# Patient Record
Sex: FEMALE | Race: ASIAN | Employment: PART TIME | URBAN - METROPOLITAN AREA
[De-identification: names, ages, dates, MRNs, and addresses within clinical notes are randomized per-mention and may not be internally consistent; named-entity substitution may affect disease eponyms.]

---

## 2017-09-16 ENCOUNTER — HOSPITAL ENCOUNTER (EMERGENCY)
Age: 22
Discharge: HOME | End: 2017-09-16
Attending: EMERGENCY MEDICINE
Payer: COMMERCIAL

## 2017-09-16 VITALS
DIASTOLIC BLOOD PRESSURE: 81 MMHG | RESPIRATION RATE: 18 BRPM | WEIGHT: 91.2 LBS | OXYGEN SATURATION: 99 % | HEART RATE: 76 BPM | BODY MASS INDEX: 18.39 KG/M2 | SYSTOLIC BLOOD PRESSURE: 125 MMHG | TEMPERATURE: 97.7 F | HEIGHT: 59 IN

## 2017-09-16 DIAGNOSIS — T19.2XXA FOREIGN BODY IN VAGINA, INITIAL ENCOUNTER: Primary | ICD-10-CM

## 2017-09-16 PROCEDURE — 99282 EMERGENCY DEPT VISIT SF MDM: CPT

## 2017-09-16 RX ORDER — LORATADINE 10 MG/1
10 TABLET ORAL
COMMUNITY

## 2017-09-16 NOTE — ED TRIAGE NOTES
Patient has a small sex toy vibrator in vaginal canal and is unable to get out. Patient went to [facility] and they were not able to get vibrator out either.  Patient c/o abd pain and nausea

## 2017-09-16 NOTE — ROUTINE PROCESS
I have reviewed discharge instructions with the patient. The patient verbalized understanding. VSS, ambulated out of the department in no acute distress.

## 2017-09-16 NOTE — DISCHARGE INSTRUCTIONS
Object in the Vagina: Care Instructions  Your Care Instructions    If something is left in the vagina for too long, it can cause pain and irritation. This could be a tampon, a diaphragm, a pessary, or a vibrator. Sometimes, a condom comes off during sex and gets lost in the vagina. You may have bleeding, a bad smell, and a discharge from the vagina. After the object is taken out, symptoms usually go away. Follow-up care is a key part of your treatment and safety. Be sure to make and go to all appointments, and call your doctor if you are having problems. It's also a good idea to know your test results and keep a list of the medicines you take. How can you care for yourself at home? · If your doctor prescribed antibiotics, take them as directed. Do not stop taking them just because you feel better. You need to take the full course of antibiotics. · Do not use a douche or vaginal wash unless your doctor tells you to. · You can prevent future problems if you limit how long something is in your vagina. For example, change a tampon at least every 4 to 8 hours. When should you call for help? Watch closely for changes in your health, and be sure to contact your doctor if:  · Your symptoms do not improve, or they get worse. · You have a fever. Where can you learn more? Go to http://jeimy-melly.info/. Enter K574 in the search box to learn more about \"Object in the Vagina: Care Instructions. \"  Current as of: March 20, 2017  Content Version: 11.3  © 9445-0769 [employer]. Care instructions adapted under license by [employer] (which disclaims liability or warranty for this information). If you have questions about a medical condition or this instruction, always ask your healthcare professional. [address] any warranty or liability for your use of this information.

## 2017-09-16 NOTE — PROGRESS NOTES
Prior to Admission Medications   Prescriptions Last Dose Informant Patient Reported? Taking?   loratadine (CLARITIN) 10 mg tablet Not Taking at Unknown time  Yes No   Sig: Take 10 mg by mouth daily as needed for Allergies. Facility-Administered Medications: None   Self: List updated per patient interview. Claritin added.

## 2017-09-16 NOTE — ED PROVIDER NOTES
Patient is a 25 y.o. female presenting with foreign body in vagina. Foreign Body in Vagina   Associated symptoms include vaginal pain. Pertinent negatives include no sore throat, no trouble swallowing, no vomiting and no abdominal pain. Pt states that she has a small sex toy vibrator in her vagina and is unable to remove it. She was evaluated at [facility] and they were also unable to remove it. She states that the foreign body has been in her vagina for several hours. She has not had any medications today prior to arrival. Pt states that she feels well today prior to this incident; Denies fever, cold symptoms,headache,  neck pain, visual changes, focal weakness or rash. History reviewed. No pertinent past medical history. History reviewed. No pertinent surgical history. History reviewed. No pertinent family history. Social History     Social History    Marital status: N/A     Spouse name: N/A    Number of children: N/A    Years of education: N/A     Occupational History    Not on file. Social History Main Topics    Smoking status: Never Smoker    Smokeless tobacco: Current User    Alcohol use Yes    Drug use: Not on file    Sexual activity: Not on file     Other Topics Concern    Not on file     Social History Narrative    No narrative on file         ALLERGIES: Review of patient's allergies indicates no known allergies. Review of Systems   Constitutional: Negative for activity change and appetite change. HENT: Negative for facial swelling, sore throat and trouble swallowing. Eyes: Negative. Respiratory: Negative for shortness of breath. Cardiovascular: Negative. Gastrointestinal: Negative for abdominal pain, diarrhea and vomiting. Genitourinary: Positive for vaginal pain. Negative for dysuria. Foreign body in the vagina   Musculoskeletal: Negative for back pain and neck pain. Skin: Negative for color change. Neurological: Negative for headaches. Psychiatric/Behavioral: Negative. Vitals:    09/16/17 1314   BP: 125/81   Pulse: 76   Resp: 18   Temp: 97.7 °F (36.5 °C)   SpO2: 99%   Weight: 41.4 kg (91 lb 3.2 oz)   Height: 4' 11\" (1.499 m)            Physical Exam   Constitutional: She is oriented to person, place, and time. She appears well-nourished. Female; college student; non smoker   HENT:   Head: Normocephalic. Mouth/Throat: Oropharynx is clear and moist.   Eyes: Pupils are equal, round, and reactive to light. Neck: Normal range of motion. Neck supple. Cardiovascular: Normal rate and regular rhythm. Pulmonary/Chest: Effort normal and breath sounds normal.   Abdominal: Soft. Bowel sounds are normal.   Genitourinary:   Genitourinary Comments: Blue sex toy vibrator visible in the vaginal vault successfully removed using pelvic forceps. Post removal exam:Pelvic exam: Normal labia, vulva without rash or lesions; no vaginal discharge or bleeding; no cervical motion tenderness; cervix is closed. Musculoskeletal: Normal range of motion. Neurological: She is alert and oriented to person, place, and time. Skin: Skin is warm and dry. No rash noted. Nursing note and vitals reviewed. [facility]  ED Course       Foreign Body Removal  Date/Time: 9/16/2017 1:50 PM  Performed by: Elif Delgado  Authorized by: Elif Delgado     Location:     Location: vagina. Depth: vaginal vault. Tendon involvement:  None  Pre-procedure details:     Imaging:  None    Neurovascular status: intact      Preparation: Patient was prepped and draped in usual sterile fashion    Anesthesia (see MAR for exact dosages): Anesthesia method:  None  Procedure type:     Procedure complexity:  Simple  Procedure details:     Dissection of underlying tissues: no      Bloodless field: yes      Removal mechanism:   Forceps (pelvic forceps)    Foreign bodies recovered:  1    Description:  Small blue vibrator (sex toy)    Intact foreign body removal: yes    Post-procedure details:     Neurovascular status: intact      Confirmation:  No additional foreign bodies on visualization    Skin closure:  None    Patient tolerance of procedure: Tolerated well, no immediate complications  Comments:      Pelvic exam post removal within normal limits. Pt denies any pain or complaints. Sangita Mo NP            Patient's results and plan of care have been reviewed with her. Patient and/or family have verbally conveyed their understanding and agreement of the patient's signs, symptoms, diagnosis, treatment and prognosis and additionally agree to follow up as recommended or return to the Emergency Room should her condition change prior to follow-up. Discharge instructions have also been provided to the patient with some educational information regarding her diagnosis as well a list of reasons why she would want to return to the ER prior to her follow-up appointment should her condition change. Discussed plan of care with  [address]  Sangita Mo NP